# Patient Record
Sex: FEMALE | Race: BLACK OR AFRICAN AMERICAN | NOT HISPANIC OR LATINO | URBAN - METROPOLITAN AREA
[De-identification: names, ages, dates, MRNs, and addresses within clinical notes are randomized per-mention and may not be internally consistent; named-entity substitution may affect disease eponyms.]

---

## 2017-01-26 ENCOUNTER — EMERGENCY (EMERGENCY)
Facility: HOSPITAL | Age: 27
LOS: 0 days | Discharge: HOME | End: 2017-01-26
Attending: EMERGENCY MEDICINE
Payer: OTHER MISCELLANEOUS

## 2017-01-26 VITALS
HEART RATE: 64 BPM | TEMPERATURE: 98 F | OXYGEN SATURATION: 100 % | RESPIRATION RATE: 17 BRPM | WEIGHT: 199.96 LBS | DIASTOLIC BLOOD PRESSURE: 89 MMHG | HEIGHT: 67 IN | SYSTOLIC BLOOD PRESSURE: 149 MMHG
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
## 2017-01-26 DIAGNOSIS — S23.9XXA SPRAIN OF UNSPECIFIED PARTS OF THORAX, INITIAL ENCOUNTER: ICD-10-CM
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
## 2017-01-26 DIAGNOSIS — Z88.0 ALLERGY STATUS TO PENICILLIN: ICD-10-CM

## 2017-01-26 DIAGNOSIS — M54.9 DORSALGIA, UNSPECIFIED: ICD-10-CM

## 2017-01-26 PROCEDURE — 99284 EMERGENCY DEPT VISIT MOD MDM: CPT

## 2017-01-26 PROCEDURE — 72070 X-RAY EXAM THORAC SPINE 2VWS: CPT | Mod: 26

## 2017-01-26 RX ORDER — IBUPROFEN 200 MG
1 TABLET ORAL
Qty: 20 | Refills: 0
Start: 2017-01-26 | End: 2017-01-31

## 2017-01-26 RX ORDER — METHOCARBAMOL 500 MG/1
1500 TABLET, FILM COATED ORAL ONCE
Qty: 0 | Refills: 0 | Status: COMPLETED | OUTPATIENT
Start: 2017-01-26 | End: 2017-01-26

## 2017-01-26 RX ORDER — IBUPROFEN 200 MG
600 TABLET ORAL ONCE
Qty: 0 | Refills: 0 | Status: COMPLETED | OUTPATIENT
Start: 2017-01-26 | End: 2017-01-26

## 2017-01-26 RX ORDER — METHOCARBAMOL 500 MG/1
2 TABLET, FILM COATED ORAL
Qty: 30 | Refills: 0
Start: 2017-01-26 | End: 2017-01-31

## 2017-01-26 RX ADMIN — METHOCARBAMOL 1500 MILLIGRAM(S): 500 TABLET, FILM COATED ORAL at 14:52

## 2017-01-26 RX ADMIN — Medication 600 MILLIGRAM(S): at 14:52

## 2017-01-26 RX ADMIN — Medication 600 MILLIGRAM(S): at 15:27

## 2017-01-26 NOTE — ED ADULT NURSE NOTE - OBJECTIVE STATEMENT
27 yo female c/o back pain radiating to upper, bilateral chest wall. x 1 today, pt reports "I lift at work, and I had to take a cab today to work this morning.."   pt ambulates with no assistance needed. frontal ha x yesterday, 7/10. pt took tylenol  pta today at 0900. 25 yo female c/o back pain radiating to upper, bilateral chest wall. x 1 today, pt reports "I lift at work, and I had to take a cab today to work this morning.."   pt ambulates with no assistance needed. frontal ha x yesterday, 7/10. pt took tylenol  pta today at 0900.,

## 2017-01-26 NOTE — ED ADULT NURSE NOTE - CHPI ED SYMPTOMS NEG
no constipation/no neck tenderness/no numbness/no fatigue/no bladder dysfunction/no anorexia/no difficulty bearing weight/no motor function loss/no tingling/no bowel dysfunction

## 2017-01-26 NOTE — ED PROVIDER NOTE - MUSCULOSKELETAL, MLM
Spine appears normal, range of motion is not limited, no midline spinal tenderness noted, upper thoracic region with increased tone on shoulders and scapular blade area mild tenderness

## 2017-01-26 NOTE — ED PROVIDER NOTE - OBJECTIVE STATEMENT
26 year old female with no significant PMH presenting to ED due to upper back pain noted - after trying to lift something at work 2 days ago, pt has been continuing to work despite stiffness but today could not move well due to pain/stiffness.

## 2017-03-07 PROBLEM — Z00.00 ENCOUNTER FOR PREVENTIVE HEALTH EXAMINATION: Status: ACTIVE | Noted: 2017-03-07

## 2018-11-13 ENCOUNTER — EMERGENCY (EMERGENCY)
Facility: HOSPITAL | Age: 28
LOS: 1 days | Discharge: HOME | End: 2018-11-13
Attending: EMERGENCY MEDICINE | Admitting: EMERGENCY MEDICINE
Payer: COMMERCIAL

## 2018-11-13 VITALS
SYSTOLIC BLOOD PRESSURE: 150 MMHG | TEMPERATURE: 98 F | RESPIRATION RATE: 18 BRPM | HEART RATE: 96 BPM | DIASTOLIC BLOOD PRESSURE: 101 MMHG | OXYGEN SATURATION: 100 %

## 2018-11-13 PROCEDURE — 73564 X-RAY EXAM KNEE 4 OR MORE: CPT | Mod: 26,LT

## 2018-11-13 PROCEDURE — 99284 EMERGENCY DEPT VISIT MOD MDM: CPT

## 2018-11-13 RX ORDER — KETOROLAC TROMETHAMINE 30 MG/ML
30 SYRINGE (ML) INJECTION ONCE
Qty: 0 | Refills: 0 | Status: DISCONTINUED | OUTPATIENT
Start: 2018-11-13 | End: 2018-11-13

## 2018-11-13 RX ADMIN — Medication 30 MILLIGRAM(S): at 19:22

## 2018-11-13 NOTE — ED ADULT TRIAGE NOTE - CHIEF COMPLAINT QUOTE
Pt states she slipped of a ladder yesterday and hurt her left knee, c/o pain and swelling to knee, denies hitting her head or any other injuries. Pt ambulatory with cane.

## 2018-11-13 NOTE — ED PROVIDER NOTE - OBJECTIVE STATEMENT
28y Female with no PMHx complaining of L knee pain x2d. Pt states she was decorating her home from a ladder when she fell off of it. Pt reports that her knee twisted to the right when L leg snagged a step on the ladder. Pt is unable to ambulate but denies any numbness or tingling. 28y Female with no PMHx complaining of L knee pain x2d. Pt states she was decorating her home from a ladder when she slipped and twisted her L knee on rung. Pt reports that her knee twisted to the right when L leg snagged a step on the ladder. Pt is able to ambulate w/ pain.  Denies head injury/LOC, CP, SOB, AP, n/v, or further pain/injury.  noted worsening pain and is supposed to restart work tomorrow so came to ED.

## 2018-11-13 NOTE — ED PROVIDER NOTE - NS_ ATTENDINGSCRIBEDETAILS _ED_A_ED_FT
The scribe's documentation has been prepared under my direction and personally reviewed by me in its entirety. I confirm that the note above accurately reflects all work, treatment, procedures, and medical decision-making performed by me.  Jesus Almodovar MD

## 2018-11-13 NOTE — ED PROVIDER NOTE - PROGRESS NOTE DETAILS
pt placed in knee immobilizer and given crutch training.  Ortho list provided and advised to f/u.  stable for dc.

## 2018-11-13 NOTE — ED PROVIDER NOTE - MEDICAL DECISION MAKING DETAILS
L knee pain, will obtain xray, give pain meds and discharge home with orthopedic follow up. L knee pain, will obtain xray, give pain meds and discharge home with orthopedic follow up if no acute fx

## 2020-10-22 ENCOUNTER — EMERGENCY (EMERGENCY)
Facility: HOSPITAL | Age: 30
LOS: 0 days | Discharge: HOME | End: 2020-10-22
Payer: COMMERCIAL

## 2020-10-22 VITALS
SYSTOLIC BLOOD PRESSURE: 151 MMHG | OXYGEN SATURATION: 99 % | HEIGHT: 67 IN | RESPIRATION RATE: 16 BRPM | TEMPERATURE: 98 F | HEART RATE: 94 BPM | WEIGHT: 220.02 LBS | DIASTOLIC BLOOD PRESSURE: 96 MMHG
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
## 2020-10-22 DIAGNOSIS — S63.501A UNSPECIFIED SPRAIN OF RIGHT WRIST, INITIAL ENCOUNTER: ICD-10-CM
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
## 2020-10-22 DIAGNOSIS — M25.531 PAIN IN RIGHT WRIST: ICD-10-CM

## 2020-10-22 DIAGNOSIS — Z88.0 ALLERGY STATUS TO PENICILLIN: ICD-10-CM

## 2020-10-22 DIAGNOSIS — G56.01 CARPAL TUNNEL SYNDROME, RIGHT UPPER LIMB: ICD-10-CM

## 2020-10-22 PROCEDURE — 73110 X-RAY EXAM OF WRIST: CPT | Mod: 26,RT

## 2020-10-22 PROCEDURE — 99283 EMERGENCY DEPT VISIT LOW MDM: CPT

## 2020-10-22 RX ORDER — IBUPROFEN 200 MG
600 TABLET ORAL ONCE
Refills: 0 | Status: COMPLETED | OUTPATIENT
Start: 2020-10-22 | End: 2020-10-22

## 2020-10-22 RX ORDER — IBUPROFEN 200 MG
1 TABLET ORAL
Qty: 28 | Refills: 0
Start: 2020-10-22 | End: 2020-10-28

## 2020-10-22 RX ADMIN — Medication 600 MILLIGRAM(S): at 18:00

## 2020-10-22 NOTE — ED PROVIDER NOTE - OBJECTIVE STATEMENT
This is a 30 F c/o of atraumatic r wrist pain x several days. Denies loc, head trauma, neck pain, chest pain, blood thinners, neuro deficit.

## 2020-10-22 NOTE — ED PROVIDER NOTE - CARE PLAN
Principal Discharge DX:	Wrist sprain, right, initial encounter  Secondary Diagnosis:	Carpal tunnel syndrome of right wrist

## 2020-10-22 NOTE — ED ADULT NURSE NOTE - OBJECTIVE STATEMENT
pt alert and oriented x4, pt c/o right wrist pain on and off for last 2 months, pt denies trauma or injury .

## 2020-10-22 NOTE — ED PROVIDER NOTE - PATIENT PORTAL LINK FT
You can access the FollowMyHealth Patient Portal offered by [facility] by registering at the following website: [email or web address]. By joining [facility]’s FollowMyHealth portal, you will also be able to view your health information using other applications (apps) compatible with our system.

## 2020-10-22 NOTE — ED PROVIDER NOTE - MUSCULOSKELETAL, MLM
Spine appears normal, range of motion is not limited, no muscle or joint tenderness RIGHT WRIST  +AROM, NO EYRTHEMA, NO STS, NEG SNUFF BOX TENDERNESS

## 2022-08-09 NOTE — ED PROCEDURE NOTE - CPROC ED INDICATIONS1
Please call to confirm - usual treatment is 12 weeks.  Was that the original plan?  (hard to tell from resident note).  I pended additional 8 weeks worth - please go ahead and send to pharmacy, unless the pt says there was a different plan.   sprain no Active ROM deficits were identified/except R hip flex sec to recent sx